# Patient Record
Sex: MALE | Race: WHITE | NOT HISPANIC OR LATINO | Employment: FULL TIME | URBAN - METROPOLITAN AREA
[De-identification: names, ages, dates, MRNs, and addresses within clinical notes are randomized per-mention and may not be internally consistent; named-entity substitution may affect disease eponyms.]

---

## 2020-05-25 LAB — CRC RECOMMENDATION EXT: NORMAL

## 2023-12-12 ENCOUNTER — OFFICE VISIT (OUTPATIENT)
Dept: FAMILY MEDICINE | Facility: CLINIC | Age: 58
End: 2023-12-12
Payer: COMMERCIAL

## 2023-12-12 VITALS
OXYGEN SATURATION: 95 % | TEMPERATURE: 99 F | SYSTOLIC BLOOD PRESSURE: 155 MMHG | HEIGHT: 72 IN | BODY MASS INDEX: 32.37 KG/M2 | DIASTOLIC BLOOD PRESSURE: 95 MMHG | HEART RATE: 87 BPM | WEIGHT: 239 LBS | RESPIRATION RATE: 18 BRPM

## 2023-12-12 DIAGNOSIS — Z13.6 ENCOUNTER FOR SCREENING FOR CARDIOVASCULAR DISORDERS: ICD-10-CM

## 2023-12-12 DIAGNOSIS — F17.200 SMOKER: ICD-10-CM

## 2023-12-12 DIAGNOSIS — D75.1 POLYCYTHEMIA: ICD-10-CM

## 2023-12-12 DIAGNOSIS — G47.33 OSA (OBSTRUCTIVE SLEEP APNEA): ICD-10-CM

## 2023-12-12 DIAGNOSIS — E66.09 CLASS 1 OBESITY DUE TO EXCESS CALORIES WITH SERIOUS COMORBIDITY AND BODY MASS INDEX (BMI) OF 32.0 TO 32.9 IN ADULT: ICD-10-CM

## 2023-12-12 DIAGNOSIS — E55.9 VITAMIN D DEFICIENCY: ICD-10-CM

## 2023-12-12 DIAGNOSIS — E78.2 MIXED HYPERLIPIDEMIA: ICD-10-CM

## 2023-12-12 DIAGNOSIS — R73.03 PREDIABETES: ICD-10-CM

## 2023-12-12 DIAGNOSIS — I10 PRIMARY HYPERTENSION: Primary | ICD-10-CM

## 2023-12-12 DIAGNOSIS — Z87.891 PERSONAL HISTORY OF TOBACCO USE, PRESENTING HAZARDS TO HEALTH: ICD-10-CM

## 2023-12-12 PROBLEM — E66.811 CLASS 1 OBESITY DUE TO EXCESS CALORIES WITH BODY MASS INDEX (BMI) OF 32.0 TO 32.9 IN ADULT: Status: ACTIVE | Noted: 2023-12-12

## 2023-12-12 PROCEDURE — 3008F BODY MASS INDEX DOCD: CPT | Mod: CPTII,,, | Performed by: FAMILY MEDICINE

## 2023-12-12 PROCEDURE — 3080F PR MOST RECENT DIASTOLIC BLOOD PRESSURE >= 90 MM HG: ICD-10-PCS | Mod: CPTII,,, | Performed by: FAMILY MEDICINE

## 2023-12-12 PROCEDURE — 3044F HG A1C LEVEL LT 7.0%: CPT | Mod: CPTII,,, | Performed by: FAMILY MEDICINE

## 2023-12-12 PROCEDURE — 1159F MED LIST DOCD IN RCRD: CPT | Mod: CPTII,,, | Performed by: FAMILY MEDICINE

## 2023-12-12 PROCEDURE — 3008F PR BODY MASS INDEX (BMI) DOCUMENTED: ICD-10-PCS | Mod: CPTII,,, | Performed by: FAMILY MEDICINE

## 2023-12-12 PROCEDURE — 99214 OFFICE O/P EST MOD 30 MIN: CPT | Mod: ,,, | Performed by: FAMILY MEDICINE

## 2023-12-12 PROCEDURE — 1160F RVW MEDS BY RX/DR IN RCRD: CPT | Mod: CPTII,,, | Performed by: FAMILY MEDICINE

## 2023-12-12 PROCEDURE — 4010F ACE/ARB THERAPY RXD/TAKEN: CPT | Mod: CPTII,,, | Performed by: FAMILY MEDICINE

## 2023-12-12 PROCEDURE — 3080F DIAST BP >= 90 MM HG: CPT | Mod: CPTII,,, | Performed by: FAMILY MEDICINE

## 2023-12-12 PROCEDURE — 1159F PR MEDICATION LIST DOCUMENTED IN MEDICAL RECORD: ICD-10-PCS | Mod: CPTII,,, | Performed by: FAMILY MEDICINE

## 2023-12-12 PROCEDURE — 3044F PR MOST RECENT HEMOGLOBIN A1C LEVEL <7.0%: ICD-10-PCS | Mod: CPTII,,, | Performed by: FAMILY MEDICINE

## 2023-12-12 PROCEDURE — 3077F PR MOST RECENT SYSTOLIC BLOOD PRESSURE >= 140 MM HG: ICD-10-PCS | Mod: CPTII,,, | Performed by: FAMILY MEDICINE

## 2023-12-12 PROCEDURE — 3077F SYST BP >= 140 MM HG: CPT | Mod: CPTII,,, | Performed by: FAMILY MEDICINE

## 2023-12-12 PROCEDURE — 1160F PR REVIEW ALL MEDS BY PRESCRIBER/CLIN PHARMACIST DOCUMENTED: ICD-10-PCS | Mod: CPTII,,, | Performed by: FAMILY MEDICINE

## 2023-12-12 PROCEDURE — 99214 PR OFFICE/OUTPT VISIT, EST, LEVL IV, 30-39 MIN: ICD-10-PCS | Mod: ,,, | Performed by: FAMILY MEDICINE

## 2023-12-12 PROCEDURE — 4010F PR ACE/ARB THEARPY RXD/TAKEN: ICD-10-PCS | Mod: CPTII,,, | Performed by: FAMILY MEDICINE

## 2023-12-12 RX ORDER — ERGOCALCIFEROL 1.25 MG/1
50000 CAPSULE ORAL
Qty: 12 CAPSULE | Refills: 3 | Status: SHIPPED | OUTPATIENT
Start: 2023-12-12 | End: 2024-03-05 | Stop reason: SDUPTHER

## 2023-12-12 RX ORDER — ROSUVASTATIN CALCIUM 5 MG/1
5 TABLET, COATED ORAL NIGHTLY
Qty: 90 TABLET | Refills: 3 | Status: SHIPPED | OUTPATIENT
Start: 2023-12-12

## 2023-12-12 RX ORDER — BENAZEPRIL HYDROCHLORIDE AND HYDROCHLOROTHIAZIDE 20; 12.5 MG/1; MG/1
1 TABLET ORAL EVERY MORNING
Qty: 90 TABLET | Refills: 3 | Status: SHIPPED | OUTPATIENT
Start: 2023-12-12

## 2023-12-12 NOTE — ASSESSMENT & PLAN NOTE
Lab Results   Component Value Date    TRIG 153 (H) 12/06/2023    HDL 47 12/06/2023    LDLCALC 101 (H) 12/06/2023   Patient is not at goal.  Will start Crestor 5 mg nightly.

## 2023-12-12 NOTE — PROGRESS NOTES
Patient Name: Jonny Michaud     Patient ID: [id number]     Chief Complaint: Results (Go over lab results.)      HPI:     Jonny Michaud is a 58 y.o. male here today for follow up for Prediabetes, Vitamin D deficiency, and lab work results. Reviewed and discussed lab work results.    Past Medical History:   Diagnosis Date    Fatty liver     AUGIE (obstructive sleep apnea)     Prediabetes     Smoker     Vitamin D deficiency         History reviewed. No pertinent surgical history.     Social History     Socioeconomic History    Marital status:     Number of children: 2   Tobacco Use    Smoking status: Every Day     Current packs/day: 1.00     Average packs/day: 1 pack/day for 37.9 years (37.9 ttl pk-yrs)     Types: Cigarettes     Start date: 1986    Smokeless tobacco: Never   Substance and Sexual Activity    Alcohol use: Yes     Alcohol/week: 21.0 standard drinks of alcohol     Types: 21 Cans of beer per week    Drug use: Never    Sexual activity: Not Currently     Social Determinants of Health     Financial Resource Strain: Low Risk  (12/12/2023)    Overall Financial Resource Strain (CARDIA)     Difficulty of Paying Living Expenses: Not hard at all   Food Insecurity: No Food Insecurity (12/12/2023)    Hunger Vital Sign     Worried About Running Out of Food in the Last Year: Never true     Ran Out of Food in the Last Year: Never true   Transportation Needs: No Transportation Needs (12/12/2023)    PRAPARE - Transportation     Lack of Transportation (Medical): No     Lack of Transportation (Non-Medical): No   Physical Activity: Sufficiently Active (12/12/2023)    Exercise Vital Sign     Days of Exercise per Week: 5 days     Minutes of Exercise per Session: 30 min   Stress: No Stress Concern Present (12/12/2023)    [employer] - Occupational Stress Questionnaire     Feeling of Stress : Not at all   Social Connections: Moderately Integrated (12/12/2023)    Social Connection and Isolation  Panel [NHANES]     Frequency of Communication with Friends and Family: Three times a week     Frequency of Social Gatherings with Friends and Family: Three times a week     Attends Gnosticist Services: More than 4 times per year     Active Member of Clubs or Organizations: No     Attends Club or Organization Meetings: Never     Marital Status:    Housing Stability: Unknown (12/12/2023)    Housing Stability Vital Sign     Unable to Pay for Housing in the Last Year: No     Unstable Housing in the Last Year: No        Current Outpatient Medications   Medication Instructions    benazepril-hydrochlorthiazide (LOTENSIN HCT) 20-12.5 mg per tablet 1 tablet, Oral, Every morning    ergocalciferol (ERGOCALCIFEROL) 50,000 Units, Oral, Every 7 days    rosuvastatin (CRESTOR) 5 mg, Oral, Nightly       Review of patient's allergies indicates:  No Known Allergies       There is no immunization history on file for this patient.    Patient Care Team:  Cayetano Ferguson Sr., MD as PCP - General (Family Medicine)  El Law MD as Consulting Physician (Gastroenterology)     Subjective:     Review of Systems    10 point review of systems conducted, negative except as stated in the history of present illness. See HPI for details.    Objective:     Visit Vitals  BP (!) 155/95 (BP Location: Right arm, Patient Position: Sitting, BP Method: Medium (Manual))   Pulse 87   Temp 98.7 °F (37.1 °C)   Resp 18   Ht 6' (1.829 m)   Wt 108.4 kg (239 lb)   SpO2 95%   BMI 32.41 kg/m²       Physical Exam  Constitutional:       Appearance: He is obese.   HENT:      Head: Normocephalic and atraumatic.   Cardiovascular:      Rate and Rhythm: Normal rate and regular rhythm.   Pulmonary:      Effort: Pulmonary effort is normal.      Breath sounds: Normal breath sounds.   Abdominal:      Palpations: Abdomen is soft.      Tenderness: There is no abdominal tenderness.   Musculoskeletal:         General: No swelling or tenderness. Normal range of  motion.      Cervical back: Normal range of motion and neck supple.      Right lower leg: No edema.      Left lower leg: No edema.   Lymphadenopathy:      Cervical: No cervical adenopathy.   Skin:     General: Skin is warm and dry.   Neurological:      General: No focal deficit present.      Mental Status: He is alert and oriented to person, place, and time.   Psychiatric:         Mood and Affect: Mood normal.         Assessment:       ICD-10-CM ICD-9-CM   1. Primary hypertension  I10 401.9   2. Mixed hyperlipidemia  E78.2 272.2   3. Prediabetes  R73.03 790.29   4. Polycythemia  D75.1 238.4   5. Class 1 obesity due to excess calories with serious comorbidity and body mass index (BMI) of 32.0 to 32.9 in adult  E66.09 278.00    Z68.32 V85.32   6. AUGIE (obstructive sleep apnea)  G47.33 327.23   7. Vitamin D deficiency  E55.9 268.9   8. Smoker  F17.200 305.1   9. Personal history of tobacco use, presenting hazards to health  Z87.891 V15.82   10. Encounter for screening for cardiovascular disorders  Z13.6 V81.2       Plan:   1. Primary hypertension  Overview:  Low Sodium Diet (DASH Diet - Less than 2 grams of sodium per day).  Monitor blood pressure daily and log. Report consistent numbers greater than 140/90.  Maintain healthy weight with goal BMI <30. Exercise 30 minutes per day, 5 days per week.    Assessment & Plan:  BP Readings from Last 1 Encounters:   12/12/23 (!) 155/95   Patient is not at goal.   Will start Benazepril HCT 20-12.5 mg every morning.  Monitor BP daily at home, BP should be less than or equal to 130/80.    Orders:  -     benazepril-hydrochlorthiazide (LOTENSIN HCT) 20-12.5 mg per tablet; Take 1 tablet by mouth every morning.  Dispense: 90 tablet; Refill: 3    2. Mixed hyperlipidemia  Overview:  Stressed importance of dietary modifications. Follow a low cholesterol, low saturated fat diet with less that 200mg of cholesterol a day.  Avoid fried foods and high saturated fats (high saturated fats less  than 7% of calories).  Add Flax Seed/Fish Oil supplements to diet. Increase dietary fiber.  Regular exercise can reduce LDL and raise HDL. Stressed importance of physical activity 5 times per week for 30 minutes per day.      Assessment & Plan:  Lab Results   Component Value Date    TRIG 153 (H) 12/06/2023    HDL 47 12/06/2023    LDLCALC 101 (H) 12/06/2023   Patient is not at goal.  Will start Crestor 5 mg nightly.    Orders:  -     rosuvastatin (CRESTOR) 5 MG tablet; Take 1 tablet (5 mg total) by mouth every evening.  Dispense: 90 tablet; Refill: 3  -     Lipid Panel; Future; Expected date: 02/12/2024  -     Hepatic Function Panel; Future; Expected date: 02/12/2024    3. Prediabetes  Overview:  Patient is diet controlled.  Follow ADA Diet. Avoid soda, simple sweets, and limit rice/pasta/breads/starches (no more than 45-50 grams per meal).  Maintain healthy weight with goal BMI <30.  Exercise 5 times per week for 30 minutes per day.  Stressed importance of daily foot exams especially if neuropathy is present.  Patient was instructed to notify physician if they notice any sores or skin lesions on the feet.  Stressed the importance of wearing proper fitting comfortable shoes i.e. diabetic footwear.  They were instructed to always wear shoes and never go barefooted.  Stressed importance of annual dilated eye exam.     Assessment & Plan:  Lab Results   Component Value Date    HGBA1C 6.0 (H) 12/06/2023   Patient is at goal.    Orders:  -     Hemoglobin A1C; Future; Expected date: 02/12/2024    4. Polycythemia  Overview:  Patient instructed to start on low-dose aspirin 82 mg daily if he is not allergic to such.  He was told to stay well hydrated at all times.  If patient is a smoker, cessation was discussed.  Donating blood at appropriate intervals as well as therapeutic phlebotomies was also discussed.    Assessment & Plan:  Most recent Hgb 18.1 g/dL and Hct 54.5 % on 12/06/2023.  Patient is not at goal.  Instructed  patient to contact [facility] to donate blood on a regular basis.    Orders:  -     CBC Auto Differential; Future; Expected date: 02/12/2024    5. Class 1 obesity due to excess calories with serious comorbidity and body mass index (BMI) of 32.0 to 32.9 in adult  Overview:  Body mass index is 32.41 kg/m².  Goal BMI <30.  Exercise 5 times a week for 30 minutes per day.  Avoid soda, simple sugars, excessive rice, potatoes or bread. Limit fast foods and fried foods.  Choose complex carbs in moderation (example: green vegetables, beans, oatmeal). Eat plenty of fresh fruits and vegetables with lean meats daily.  Do not skip meals. Eat a balanced portion size.  Avoid fad diets. Consider permanent healthy life style changes.        6. AUGIE (obstructive sleep apnea)  Overview:  Wears CPAP/BiPAP and reports compliance nightly. Life time use expected.  Has experienced improved daytime fatigue.  Avoid excessive alcohol, smoking and overuse of sedatives at bedtime.  Weight management discussed. Goal BMI <30.  Adjust sleep position PRN.      7. Vitamin D deficiency  Overview:  Continue with vitamin-D supplement.    Assessment & Plan:  Most recent Vitamin D 30.5 ng/mL on 12/06/2023.  D/C OTC Vitamin D3 5,000 units.  Will start Vitamin D 50,000 units Q week.    Orders:  -     ergocalciferol (ERGOCALCIFEROL) 50,000 unit Cap; Take 1 capsule (50,000 Units total) by mouth every 7 days.  Dispense: 12 capsule; Refill: 3    8. Smoker  Overview:  Patient was offered smoking cessation techniques such as nicotine gum or patches.  They were also offered a more regimented smoking cessation program.  They were advised to decrease the number of cigarettes by 1 every few days until they completely stopped.  It was strongly recommended that they set a quit date and stick to it.  And finally, important health reasons to stop smoking were given to the patient. Approximately 10 minutes was spent discussing smoking cessation.    Orders:  -     Ambulatory  referral/consult to Smoking Cessation Program; Future; Expected date: 01/12/2024    9. Personal history of tobacco use, presenting hazards to health  -     CT Chest Lung Screening Low Dose; Future; Expected date: 01/12/2024    10. Encounter for screening for cardiovascular disorders  -     CT Calcium Scoring Cardiac; Future; Expected date: 01/12/2024        [x] Discussed lab findings with the patient.  [x] Discussed diet, exercise and if appropriate, weight loss.  [x] Instructions and information, including risks and benefits of prescribed medication(s) have been reviewed with the patient and patient verbalizes understanding. Questions have been answered to the patient's satisfaction.  [] Appropriate counseling has been given regarding anxiety and depressive issues that were discussed today.  [] Any lab drawn today will be reviewed by physician at the time it is received and appropriate recommendations bill be made and discussed with patient.     Follow up in about 2 months (around 2/12/2024) for Follow Up, With Fasting Labs prior to visit.   In addition to their scheduled follow up, the patient has also been instructed to follow up on as needed basis.     Future Appointments   Date Time Provider Department Center   2/20/2024  7:40 AM LAB, [facility]   2/26/2024  2:00 PM Cayetano Ferguson Sr., MD [facility] [place] [facility] Jb Bourgeois Sr, MD

## 2023-12-12 NOTE — ASSESSMENT & PLAN NOTE
Most recent Vitamin D 30.5 ng/mL on 12/06/2023.  D/C OTC Vitamin D3 5,000 units.  Will start Vitamin D 50,000 units Q week.

## 2023-12-12 NOTE — ASSESSMENT & PLAN NOTE
Most recent Hgb 18.1 g/dL and Hct 54.5 % on 12/06/2023.  Patient is not at goal.  Instructed patient to contact [facility] to donate blood on a regular basis.

## 2023-12-12 NOTE — ASSESSMENT & PLAN NOTE
BP Readings from Last 1 Encounters:   12/12/23 (!) 155/95   Patient is not at goal.   Will start Benazepril HCT 20-12.5 mg every morning.  Monitor BP daily at home, BP should be less than or equal to 130/80.

## 2024-01-17 DIAGNOSIS — K76.0 FATTY LIVER: Primary | ICD-10-CM

## 2024-01-17 DIAGNOSIS — Z87.891 PERSONAL HISTORY OF TOBACCO USE, PRESENTING HAZARDS TO HEALTH: ICD-10-CM

## 2024-01-18 DIAGNOSIS — R91.1 SOLITARY PULMONARY NODULE: Primary | ICD-10-CM

## 2024-02-02 ENCOUNTER — DOCUMENTATION ONLY (OUTPATIENT)
Dept: FAMILY MEDICINE | Facility: CLINIC | Age: 59
End: 2024-02-02
Payer: COMMERCIAL

## 2024-02-28 ENCOUNTER — OFFICE VISIT (OUTPATIENT)
Dept: FAMILY MEDICINE | Facility: CLINIC | Age: 59
End: 2024-02-28
Payer: COMMERCIAL

## 2024-02-28 VITALS
TEMPERATURE: 97 F | BODY MASS INDEX: 33.4 KG/M2 | WEIGHT: 246.63 LBS | HEIGHT: 72 IN | HEART RATE: 74 BPM | OXYGEN SATURATION: 94 % | SYSTOLIC BLOOD PRESSURE: 144 MMHG | RESPIRATION RATE: 20 BRPM | DIASTOLIC BLOOD PRESSURE: 88 MMHG

## 2024-02-28 DIAGNOSIS — I10 PRIMARY HYPERTENSION: Primary | ICD-10-CM

## 2024-02-28 DIAGNOSIS — R73.03 PREDIABETES: ICD-10-CM

## 2024-02-28 DIAGNOSIS — E55.9 VITAMIN D DEFICIENCY: ICD-10-CM

## 2024-02-28 DIAGNOSIS — R91.1 SOLITARY PULMONARY NODULE: ICD-10-CM

## 2024-02-28 DIAGNOSIS — F17.200 SMOKER: ICD-10-CM

## 2024-02-28 DIAGNOSIS — E78.2 MIXED HYPERLIPIDEMIA: ICD-10-CM

## 2024-02-28 DIAGNOSIS — D75.1 POLYCYTHEMIA: ICD-10-CM

## 2024-02-28 PROBLEM — Z87.891 PERSONAL HISTORY OF TOBACCO USE, PRESENTING HAZARDS TO HEALTH: Status: RESOLVED | Noted: 2023-12-12 | Resolved: 2024-02-28

## 2024-02-28 PROCEDURE — 3077F SYST BP >= 140 MM HG: CPT | Mod: CPTII,,, | Performed by: FAMILY MEDICINE

## 2024-02-28 PROCEDURE — 1159F MED LIST DOCD IN RCRD: CPT | Mod: CPTII,,, | Performed by: FAMILY MEDICINE

## 2024-02-28 PROCEDURE — 3008F BODY MASS INDEX DOCD: CPT | Mod: CPTII,,, | Performed by: FAMILY MEDICINE

## 2024-02-28 PROCEDURE — 3079F DIAST BP 80-89 MM HG: CPT | Mod: CPTII,,, | Performed by: FAMILY MEDICINE

## 2024-02-28 PROCEDURE — 99214 OFFICE O/P EST MOD 30 MIN: CPT | Mod: ,,, | Performed by: FAMILY MEDICINE

## 2024-02-28 PROCEDURE — 3044F HG A1C LEVEL LT 7.0%: CPT | Mod: CPTII,,, | Performed by: FAMILY MEDICINE

## 2024-02-28 RX ORDER — VITAMIN E 268 MG
400 CAPSULE ORAL DAILY
COMMUNITY

## 2024-02-28 NOTE — PROGRESS NOTES
Patient Name: Jonny Michaud     Patient ID: [id number]     Chief Complaint: Results (Patient here to discuss lab results.)      HPI:     Jonny Michaud is a 58 y.o. male here today for follow-up on hypertension, prediabetes, hyperlipidemia and vitamin-D deficiency.    Past Medical History:   Diagnosis Date    Fatty liver     AUGIE (obstructive sleep apnea)     Personal history of tobacco use, presenting hazards to health 2023    Prediabetes     Smoker     Vitamin D deficiency         History reviewed. No pertinent surgical history.     Social History     Socioeconomic History    Marital status:     Number of children: 2   Tobacco Use    Smoking status: Former     Current packs/day: 0.00     Average packs/day: 1 pack/day for 38.0 years (38.0 ttl pk-yrs)     Types: Cigarettes     Start date:      Quit date: 2023     Years since quittin.2    Smokeless tobacco: Never   Substance and Sexual Activity    Alcohol use: Yes     Alcohol/week: 21.0 standard drinks of alcohol     Types: 21 Cans of beer per week    Drug use: Never    Sexual activity: Not Currently     Social Determinants of Health     Financial Resource Strain: Low Risk  (2023)    Overall Financial Resource Strain (CARDIA)     Difficulty of Paying Living Expenses: Not hard at all   Food Insecurity: No Food Insecurity (2023)    Hunger Vital Sign     Worried About Running Out of Food in the Last Year: Never true     Ran Out of Food in the Last Year: Never true   Transportation Needs: No Transportation Needs (2023)    PRAPARE - Transportation     Lack of Transportation (Medical): No     Lack of Transportation (Non-Medical): No   Physical Activity: Sufficiently Active (2023)    Exercise Vital Sign     Days of Exercise per Week: 5 days     Minutes of Exercise per Session: 30 min   Stress: No Stress Concern Present (2023)    [employer] [place] [employer] - Occupational Stress Questionnaire     Feeling of  Stress : Not at all   Social Connections: Moderately Integrated (12/12/2023)    Social Connection and Isolation Panel [NHANES]     Frequency of Communication with Friends and Family: Three times a week     Frequency of Social Gatherings with Friends and Family: Three times a week     Attends Buddhism Services: More than 4 times per year     Active Member of Clubs or Organizations: No     Attends Club or Organization Meetings: Never     Marital Status:    Housing Stability: Unknown (12/12/2023)    Housing Stability Vital Sign     Unable to Pay for Housing in the Last Year: No     Unstable Housing in the Last Year: No        Current Outpatient Medications   Medication Instructions    benazepril-hydrochlorthiazide (LOTENSIN HCT) 20-12.5 mg per tablet 1 tablet, Oral, Every morning    ergocalciferol (ERGOCALCIFEROL) 50,000 Units, Oral, Every 7 days    rosuvastatin (CRESTOR) 5 mg, Oral, Nightly    vitamin E 400 Units, Oral, Daily       Review of patient's allergies indicates:  No Known Allergies       There is no immunization history on file for this patient.    Patient Care Team:  Cayetano Ferguson Sr., MD as PCP - General (Family Medicine)  El Law MD as Consulting Physician (Gastroenterology)     Subjective:     Review of Systems    10 point review of systems conducted, negative except as stated in the history of present illness. See HPI for details.    Objective:     Visit Vitals  BP (!) 144/88 (BP Location: Right arm, Patient Position: Sitting, BP Method: Medium (Manual))   Pulse 74   Temp 97.2 °F (36.2 °C)   Resp 20   Ht 6' (1.829 m)   Wt 111.9 kg (246 lb 9.6 oz)   SpO2 (!) 94%   BMI 33.44 kg/m²       Physical Exam    Assessment:       ICD-10-CM ICD-9-CM   1. Primary hypertension  I10 401.9   2. Mixed hyperlipidemia  E78.2 272.2   3. Prediabetes  R73.03 790.29   4. Smoker -former  F17.200 305.1   5. Polycythemia  D75.1 238.4   6. Vitamin D deficiency  E55.9 268.9   7. Solitary pulmonary nodule   R91.1 793.11       Plan:   1. Primary hypertension  Overview:  Current medication: Benazepril - Hydrochlorthiazide 20-12.5 mg one po q day.  Low Sodium Diet (DASH Diet - Less than 2 grams of sodium per day).  Monitor blood pressure daily and log. Report consistent numbers greater than 140/90.  Maintain healthy weight with goal BMI <30. Exercise 30 minutes per day, 5 days per week.    Assessment & Plan:  Pt reports B/P readings at home 128/86. Instructed pt to being home cuff at next visit.      2. Mixed hyperlipidemia  Overview:  Current medication: Crestor 5 mg one po qhs.  Stressed importance of dietary modifications. Follow a low cholesterol, low saturated fat diet with less that 200mg of cholesterol a day.  Avoid fried foods and high saturated fats (high saturated fats less than 7% of calories).  Add Flax Seed/Fish Oil supplements to diet. Increase dietary fiber.  Regular exercise can reduce LDL and raise HDL. Stressed importance of physical activity 5 times per week for 30 minutes per day.        3. Prediabetes  Overview:  Patient is diet controlled.  Follow ADA Diet. Avoid soda, simple sweets, and limit rice/pasta/breads/starches (no more than 45-50 grams per meal).  Maintain healthy weight with goal BMI <30.  Exercise 5 times per week for 30 minutes per day.  Stressed importance of daily foot exams especially if neuropathy is present.  Patient was instructed to notify physician if they notice any sores or skin lesions on the feet.  Stressed the importance of wearing proper fitting comfortable shoes i.e. diabetic footwear.  They were instructed to always wear shoes and never go barefooted.  Stressed importance of annual dilated eye exam.     Assessment & Plan:  02/20/24 A1C= 5.9%  12/06/2023 A1C- 6.0%  Pt. has made dietary modifications. He stopped drinking sweet tea and Mountain Dew        4. Smoker -former  Overview:  Patient was offered smoking cessation techniques such as nicotine gum or patches.  They  were also offered a more regimented smoking cessation program.  They were advised to decrease the number of cigarettes by 1 every few days until they completely stopped.  It was strongly recommended that they set a quit date and stick to it.  And finally, important health reasons to stop smoking were given to the patient. Approximately 10 minutes was spent discussing smoking cessation.    Assessment & Plan:  Pt. Quit smoking December16,2023.       5. Polycythemia  Overview:   He was told to stay well hydrated at all times.  He has stopped smoking . Continued monitoring of his H&H    Assessment & Plan:  02/20/2024  H/H 15.85&47.9      6. Vitamin D deficiency  Overview:  Continue with vitamin-D supplement 42488 units weekly.    Assessment & Plan:  Patient's last vitamin-D level was 30.5 on 12/06/2023.  Patient has been on 68210 units weekly since that time and we are repeating his vitamin-D level today.      7. Solitary pulmonary nodule  Comments:  We will continue to monitor this finding with a repeat CT.        [x] Discussed lab findings with the patient.  [x] Discussed diet, exercise and if appropriate, weight loss.  [] Instructions and information, including risks and benefits of prescribed medication(s) have been reviewed with the patient and patient verbalizes understanding. Questions have been answered to the patient's satisfaction.  [] Appropriate counseling has been given regarding anxiety and depressive issues that were discussed today.  [] Any lab drawn today will be reviewed by physician at the time it is received and appropriate recommendations bill be made and discussed with patient.     No follow-ups on file.   In addition to their scheduled follow up, the patient has also been instructed to follow up on as needed basis.     No future appointments.     Cayetano Ferguson Sr, MD

## 2024-02-28 NOTE — ASSESSMENT & PLAN NOTE
02/20/24 A1C= 5.9%  12/06/2023 A1C- 6.0%  Pt. has made dietary modifications. He stopped drinking sweet tea and Mountain Dew     Right L4/L5 TFESI.   Quality 226: Preventive Care And Screening: Tobacco Use: Screening And Cessation Intervention: Patient screened for tobacco use and is an ex/non-smoker Detail Level: Detailed Quality 130: Documentation Of Current Medications In The Medical Record: Current Medications Documented Quality 431: Preventive Care And Screening: Unhealthy Alcohol Use - Screening: Patient not identified as an unhealthy alcohol user when screened for unhealthy alcohol use using a systematic screening method

## 2024-02-28 NOTE — ASSESSMENT & PLAN NOTE
Patient's last vitamin-D level was 30.5 on 12/06/2023.  Patient has been on 89291 units weekly since that time and we are repeating his vitamin-D level today.

## 2024-02-29 LAB — 25(OH)D3+25(OH)D2 SERPL-MCNC: 36.2 NG/ML (ref 30–100)

## 2024-03-05 ENCOUNTER — TELEPHONE (OUTPATIENT)
Dept: FAMILY MEDICINE | Facility: CLINIC | Age: 59
End: 2024-03-05
Payer: COMMERCIAL

## 2024-03-05 DIAGNOSIS — E55.9 VITAMIN D DEFICIENCY: ICD-10-CM

## 2024-03-05 RX ORDER — ERGOCALCIFEROL 1.25 MG/1
50000 CAPSULE ORAL
Qty: 12 CAPSULE | Refills: 5 | Status: SHIPPED | OUTPATIENT
Start: 2024-03-05

## 2024-03-05 NOTE — TELEPHONE ENCOUNTER
Spoke with Dr Ferguson regarding patients Vit D level results and they came back 36.2 (2/28/2024) @LDA(10)@ [facility] ordered Rx Vit D 3 (Ergocalciferol) 50,000IU 1 capsule Q 5 days  # 12  X 5 refills. And will recheck his vit D with his lab in August appointment 8/29/24 lab and results 9/4/24.

## 2024-03-06 ENCOUNTER — CLINICAL SUPPORT (OUTPATIENT)
Dept: FAMILY MEDICINE | Facility: CLINIC | Age: 59
End: 2024-03-06
Payer: COMMERCIAL

## 2024-03-06 VITALS
WEIGHT: 244 LBS | HEART RATE: 83 BPM | HEIGHT: 72 IN | BODY MASS INDEX: 33.05 KG/M2 | TEMPERATURE: 100 F | OXYGEN SATURATION: 97 % | SYSTOLIC BLOOD PRESSURE: 122 MMHG | RESPIRATION RATE: 18 BRPM | DIASTOLIC BLOOD PRESSURE: 80 MMHG

## 2024-03-06 DIAGNOSIS — I10 PRIMARY HYPERTENSION: Primary | ICD-10-CM

## 2024-03-06 PROCEDURE — 99211 OFF/OP EST MAY X REQ PHY/QHP: CPT | Mod: ,,, | Performed by: FAMILY MEDICINE

## 2024-03-06 NOTE — PROGRESS NOTES
Patient came in today for a blood pressure check and a at home cuff calibration. The patients blood pressure was 120/75 and his at home cuff read 122/76 so his readings were good and his at home cuff if calibrated. Patient will monitor his blood pressure once a day for two weeks and then once a week for a month. Patient was instructed to give us a call if his systolic number goes above 140.

## 2024-03-18 PROBLEM — Z13.6 ENCOUNTER FOR SCREENING FOR CARDIOVASCULAR DISORDERS: Status: RESOLVED | Noted: 2023-12-12 | Resolved: 2024-03-18

## 2024-07-19 ENCOUNTER — TELEPHONE (OUTPATIENT)
Dept: FAMILY MEDICINE | Facility: CLINIC | Age: 59
End: 2024-07-19
Payer: COMMERCIAL

## 2024-07-19 DIAGNOSIS — R91.8 OTHER NONSPECIFIC ABNORMAL FINDING OF LUNG FIELD: Primary | ICD-10-CM

## 2024-07-19 NOTE — TELEPHONE ENCOUNTER
----- Message from Andrew Smith MD sent at 7/18/2024 10:20 AM CDT -----  Heel and right upper lobe.  Repeat chest CT in 1 year.

## 2024-09-23 ENCOUNTER — TELEPHONE (OUTPATIENT)
Dept: PHARMACY | Facility: CLINIC | Age: 59
End: 2024-09-23
Payer: COMMERCIAL

## 2024-09-24 NOTE — TELEPHONE ENCOUNTER
[facility]/Population Health Chart Review & Patient Outreach Details For Medication Adherence Project    Reason for Outreach Encounter: 3rd Party payor non-compliance report ([employer], [employer], [facility], etc)  2.  Patient Outreach Method: Simplificarehart message  3.   Medication in question:    LAST FILLED: 3/19/24 for 90 day supply for both  Hypertension Medications               benazepril-hydrochlorthiazide (LOTENSIN HCT) 20-12.5 mg per tablet Take 1 tablet by mouth every morning.              Hyperlipidemia Medications               rosuvastatin (CRESTOR) 5 MG tablet Take 1 tablet (5 mg total) by mouth every evening.               4.  Reviewed and or Updates Made To: Patient Chart  5. Outreach Outcomes and/or actions taken: Sent inquiry to patient: Waiting for response.

## 2024-09-26 ENCOUNTER — OFFICE VISIT (OUTPATIENT)
Dept: FAMILY MEDICINE | Facility: CLINIC | Age: 59
End: 2024-09-26
Payer: COMMERCIAL

## 2024-09-26 VITALS
RESPIRATION RATE: 20 BRPM | OXYGEN SATURATION: 100 % | BODY MASS INDEX: 35.73 KG/M2 | SYSTOLIC BLOOD PRESSURE: 146 MMHG | WEIGHT: 263.81 LBS | TEMPERATURE: 98 F | DIASTOLIC BLOOD PRESSURE: 98 MMHG | HEIGHT: 72 IN | HEART RATE: 90 BPM

## 2024-09-26 DIAGNOSIS — Z12.5 SCREENING FOR MALIGNANT NEOPLASM OF PROSTATE: ICD-10-CM

## 2024-09-26 DIAGNOSIS — M25.561 ACUTE PAIN OF BOTH KNEES: ICD-10-CM

## 2024-09-26 DIAGNOSIS — I10 PRIMARY HYPERTENSION: Primary | ICD-10-CM

## 2024-09-26 DIAGNOSIS — Z11.59 ENCOUNTER FOR HEPATITIS C SCREENING TEST FOR LOW RISK PATIENT: ICD-10-CM

## 2024-09-26 DIAGNOSIS — M25.562 ACUTE PAIN OF BOTH KNEES: ICD-10-CM

## 2024-09-26 DIAGNOSIS — Z11.4 ENCOUNTER FOR SCREENING FOR HIV: ICD-10-CM

## 2024-09-26 DIAGNOSIS — R73.03 PREDIABETES: ICD-10-CM

## 2024-09-26 DIAGNOSIS — E78.2 MIXED HYPERLIPIDEMIA: ICD-10-CM

## 2024-09-26 DIAGNOSIS — Z00.00 WELLNESS EXAMINATION: ICD-10-CM

## 2024-09-26 RX ORDER — DICLOFENAC SODIUM 10 MG/G
2 GEL TOPICAL 4 TIMES DAILY
Qty: 100 G | Refills: 2 | Status: SHIPPED | OUTPATIENT
Start: 2024-09-26

## 2024-09-26 NOTE — PROGRESS NOTES
"  Family Medicine    Patient ID: [id number]     Chief Complaint: Annual Exam, Results (Patient here for lab results.), and Skin Tags (Patient request referral to remove skin tags from under both arms.)      HPI:     Jonny Michaud is a 58 y.o. male here today for an annual wellness. Reviewed and discussed lab results.    Patient has about seven or 8 total skin tags to bilateral flanks that he would like removed.  They do not bother him, but he does not want them to get out-of-control" like his dad's did.    His blood pressure at home is usually in the 120s/80. In clinic 146/98. He is compliant with Lotensin HCTZ     Health Maintenance         Date Due Completion Date    Hepatitis C Screening Never done ---    Pneumococcal Vaccines (Age 0-64) (1 of 2 - PCV) Never done ---    HIV Screening Never done ---    TETANUS VACCINE Never done ---    COVID-19 Vaccine (2023-24 season) 2024    LDCT Lung Screen 2025    Hemoglobin A1c (Prediabetes) 2025    Lipid Panel 2029    Colorectal Cancer Screening 2030            Past Medical History:   Diagnosis Date    Fatty liver     AUGIE (obstructive sleep apnea)     Personal history of tobacco use, presenting hazards to health 2023    Prediabetes     Smoker     Vitamin D deficiency         History reviewed. No pertinent surgical history.     Social History     Tobacco Use    Smoking status: Former     Current packs/day: 0.00     Average packs/day: 1 pack/day for 38.0 years (38.0 ttl pk-yrs)     Types: Cigarettes     Start date:      Quit date: 2023     Years since quittin.7    Smokeless tobacco: Never   Substance and Sexual Activity    Alcohol use: Yes     Alcohol/week: 21.0 standard drinks of alcohol     Types: 21 Cans of beer per week    Drug use: Never    Sexual activity: Not Currently        Current Outpatient Medications   Medication Instructions    benazepril-hydrochlorthiazide " (LOTENSIN HCT) 20-12.5 mg per tablet 1 tablet, Oral, Every morning    diclofenac sodium (VOLTAREN ARTHRITIS PAIN) 2 g, Topical (Top), 4 times daily    ergocalciferol (ERGOCALCIFEROL) 50,000 Units, Oral, Every 5 days    NON FORMULARY MEDICATION Daily, Olive Oil Pill<BR><BR><BR><BR>Dr Law    rosuvastatin (CRESTOR) 5 mg, Oral, Nightly    vitamin E 400 Units, Oral, Daily       Review of patient's allergies indicates:  No Known Allergies     Patient Care Team:  Cayetano Ferguson Sr., MD as PCP - General (Family Medicine)  El Law MD as Consulting Physician (Gastroenterology)     Subjective:     Review of Systems   Constitutional:  Negative for activity change and unexpected weight change.   HENT:  Negative for hearing loss, rhinorrhea and trouble swallowing.    Eyes:  Negative for discharge and visual disturbance.   Respiratory:  Negative for chest tightness and wheezing.    Cardiovascular:  Negative for chest pain and palpitations.   Gastrointestinal:  Negative for blood in stool, constipation, diarrhea and vomiting.   Endocrine: Negative for polydipsia and polyuria.   Genitourinary:  Negative for difficulty urinating, hematuria and urgency.   Musculoskeletal:  Positive for arthralgias and joint swelling. Negative for neck pain.   Neurological:  Negative for weakness and headaches.   Psychiatric/Behavioral:  Negative for confusion and dysphoric mood.        12 point review of systems conducted, negative except as stated in the history of present illness. See HPI for details.    Objective:     Visit Vitals  BP (!) 146/98 (BP Location: Right arm, Patient Position: Sitting, BP Method: Medium (Manual))   Pulse 90   Temp 97.8 °F (36.6 °C)   Resp 20   Ht 6' (1.829 m)   Wt 119.7 kg (263 lb 12.8 oz)   SpO2 100%   BMI 35.78 kg/m²       Physical Exam  Vitals and nursing note reviewed.   Constitutional:       General: He is not in acute distress.     Appearance: He is not ill-appearing.   HENT:      Head:  "Normocephalic and atraumatic.      Mouth/Throat:      Mouth: Mucous membranes are moist.      Pharynx: Oropharynx is clear.   Eyes:      General: No scleral icterus.     Extraocular Movements: Extraocular movements intact.      Conjunctiva/sclera: Conjunctivae normal.      Pupils: Pupils are equal, round, and reactive to light.   Neck:      Vascular: No carotid bruit.   Cardiovascular:      Rate and Rhythm: Normal rate and regular rhythm.      Heart sounds: No murmur heard.     No friction rub. No gallop.   Pulmonary:      Effort: Pulmonary effort is normal. No respiratory distress.      Breath sounds: Normal breath sounds. No wheezing, rhonchi or rales.   Musculoskeletal:         General: Normal range of motion.      Cervical back: Normal range of motion and neck supple.   Skin:     General: Skin is warm and dry.      Comments: Small multiple skin tags to bilateral flanks   Neurological:      General: No focal deficit present.      Mental Status: He is alert.   Psychiatric:         Mood and Affect: Mood normal.         Labs Reviewed:     Chemistry:  Lab Results   Component Value Date     08/29/2024    K 5.0 08/29/2024    BUN 11 08/29/2024    CREATININE 0.78 08/29/2024    EGFRNORACEVR 104 08/29/2024    CALCIUM 9.0 08/29/2024    ALBUMIN 4.1 08/29/2024    BILIDIR <0.20 02/20/2024    AST 32 08/29/2024    ALT 39 08/29/2024    PQWUAVGW25FY 29.9 (L) 08/29/2024    TSH 2.060 08/29/2024        Lab Results   Component Value Date    HGBA1C 5.9 (H) 08/29/2024        Hematology:  Lab Results   Component Value Date    WBC 6.5 08/29/2024    HGB 15.6 08/29/2024    HCT 48.9 08/29/2024     08/29/2024       Lipid Panel:  Lab Results   Component Value Date    CHOL 154 08/29/2024    HDL 51 08/29/2024    TRIG 122 08/29/2024        Urine:  No results found for: "COLORUA", "APPEARANCEUA", "SGUA", "PHUA", "PROTEINUA", "GLUCOSEUA", "KETONESUA", "BLOODUA", "NITRITESUA", "LEUKOCYTESUR", "RBCUA", "WBCUA", "BACTERIA", "SQEPUA", " ""HYALINECASTS", "CREATRANDUR", "PROTEINURINE", "UPROTCREA"     Assessment:       ICD-10-CM ICD-9-CM   1. Primary hypertension  I10 401.9   2. Encounter for hepatitis C screening test for low risk patient  Z11.59 V73.89   3. Encounter for screening for HIV  Z11.4 V73.89   4. Mixed hyperlipidemia  E78.2 272.2   5. Prediabetes  R73.03 790.29   6. Wellness examination  Z00.00 V70.0   7. Screening for malignant neoplasm of prostate  Z12.5 V76.44   8. Acute pain of both knees  M25.561 338.19    M25.562 719.46        Plan:     AAA Screening - due at age 66    Prostate Cancer Screening - last PSA was 3.3 in December 20, 2023    Colon Cancer Screening -  Colonoscopy on 5/25/2020 - diverticulosis otherwise normal, repeat in 10 years    Eye Exam - Recommend annually. Last Eye Exam - 1/2018.    Dental Exam - Recommend biannual exams.     Vaccinations -   Immunization History   Administered Date(s) Administered    COVID-19 MRNA, LN-S PF (MODERNA HALF 0.25 ML DOSE) 07/21/2022    COVID-19, MRNA, LN-S, PF (MODERNA FULL 0.5 ML DOSE) 06/12/2021, 07/10/2021    COVID-19, mRNA, LNP-S, bivalent booster, PF (Moderna Omicron)12 + YEARS 12/31/2022    Zoster Recombinant 07/21/2022, 12/31/2022        1. Primary hypertension  Overview:  Current medication: Benazepril - Hydrochlorthiazide 20-12.5 mg one po q day.  Low Sodium Diet (DASH Diet - Less than 2 grams of sodium per day).  Monitor blood pressure daily and log. Report consistent numbers greater than 140/90.  Maintain healthy weight with goal BMI <30. Exercise 30 minutes per day, 5 days per week.    Assessment & Plan:  Blood pressure slightly elevated in clinic but patient reports normals at home.  Continue current regimen.    AHA/ACC goal <130/80. JNC8 goal <140/90       Orders:  -     Comprehensive Metabolic Panel; Future; Expected date: 12/26/2024    2. Encounter for hepatitis C screening test for low risk patient  -     Hepatitis C Antibody; Future; Expected date: 12/26/2024    3. " Encounter for screening for HIV  -     HIV 1/2 Ag/Ab (4th Gen); Future; Expected date: 12/26/2024    4. Mixed hyperlipidemia  Overview:  Current medication: Crestor 5 mg one po qhs.  Stressed importance of dietary modifications. Follow a low cholesterol, low saturated fat diet with less that 200mg of cholesterol a day.  Avoid fried foods and high saturated fats (high saturated fats less than 7% of calories).  Add Flax Seed/Fish Oil supplements to diet. Increase dietary fiber.  Regular exercise can reduce LDL and raise HDL. Stressed importance of physical activity 5 times per week for 30 minutes per day.        5. Prediabetes  Overview:  Patient is diet controlled.  Follow ADA Diet. Avoid soda, simple sweets, and limit rice/pasta/breads/starches (no more than 45-50 grams per meal).  Maintain healthy weight with goal BMI <30.  Exercise 5 times per week for 30 minutes per day.  Stressed importance of daily foot exams especially if neuropathy is present.  Patient was instructed to notify physician if they notice any sores or skin lesions on the feet.  Stressed the importance of wearing proper fitting comfortable shoes i.e. diabetic footwear.  They were instructed to always wear shoes and never go barefooted.  Stressed importance of annual dilated eye exam.       6. Wellness examination    7. Screening for malignant neoplasm of prostate  -     PSA, Screening; Future; Expected date: 12/26/2024    8. Acute pain of both knees  Assessment & Plan:  Patient has intermittent pain and swelling to both knees.  It is flared up over the last few days.  He is complaining of mild pain.  Trial of diclofenac gel.      Other orders  -     diclofenac sodium (VOLTAREN ARTHRITIS PAIN) 1 % Gel; Apply 2 g topically 4 (four) times daily.  Dispense: 100 g; Refill: 2         Follow up in about 2 weeks (around 10/10/2024) for skin tag removal. In addition to their scheduled follow up, the patient has also been instructed to follow up on as  needed basis.     Future Appointments   Date Time Provider Department Center   10/17/2024 11:00 AM [facility], [facility] [place]   12/19/2024  9:40 AM LAB, [facility] [place]   1/17/2025  8:00 AM [facility] CT1 500 LB LIMIT [facility]        Yojaan Rowley MD

## 2024-09-26 NOTE — ASSESSMENT & PLAN NOTE
Blood pressure slightly elevated in clinic but patient reports normals at home.  Continue current regimen.    AHA/ACC goal <130/80. JNC8 goal <140/90

## 2024-09-26 NOTE — ASSESSMENT & PLAN NOTE
Patient has intermittent pain and swelling to both knees.  It is flared up over the last few days.  He is complaining of mild pain.  Trial of diclofenac gel.

## 2024-10-17 ENCOUNTER — OFFICE VISIT (OUTPATIENT)
Dept: FAMILY MEDICINE | Facility: CLINIC | Age: 59
End: 2024-10-17
Payer: COMMERCIAL

## 2024-10-17 VITALS
OXYGEN SATURATION: 97 % | TEMPERATURE: 98 F | WEIGHT: 260 LBS | HEART RATE: 80 BPM | BODY MASS INDEX: 35.21 KG/M2 | RESPIRATION RATE: 20 BRPM | HEIGHT: 72 IN | DIASTOLIC BLOOD PRESSURE: 86 MMHG | SYSTOLIC BLOOD PRESSURE: 136 MMHG

## 2024-10-17 DIAGNOSIS — L91.8 ACROCHORDON: Primary | ICD-10-CM

## 2024-10-17 NOTE — PROCEDURES
Stin Tag removal    Date/Time: 10/17/2024 11:00 AM    Performed by: Yojana Rowley MD  Authorized by: Yojana Rowley MD    Consent Done?:  Yes (Verbal)  Timeout: prior to procedure the correct patient, procedure, and site was verified    Local anesthesia used?: No    Assistants?: Yes    List of assistants:  Erik Lyles NP  : Aylin (12)  Location: 4 to left flank, 8 to right flank.  Excision type:  Skin  Malignancy:  Benign  Specimens?: No     Hemostasis was obtained.   Patient tolerated the procedure well with no immediate complications.    The skin around each skin tag was prepped with iodine.  This was allowed to dry.  Scissors were used dyspneic each skin tag at its base.  Four were removed from the right flank and 8 were removed from the left flank.  There was minimal oozing of blood from each lesion.  Hemostasis was achieved with silver nitrate.  No dressings were needed.  The patient tolerated the procedure well.

## 2025-01-06 ENCOUNTER — OFFICE VISIT (OUTPATIENT)
Dept: FAMILY MEDICINE | Facility: CLINIC | Age: 60
End: 2025-01-06
Payer: COMMERCIAL

## 2025-01-06 VITALS
OXYGEN SATURATION: 97 % | DIASTOLIC BLOOD PRESSURE: 90 MMHG | WEIGHT: 269 LBS | TEMPERATURE: 98 F | HEIGHT: 72 IN | SYSTOLIC BLOOD PRESSURE: 147 MMHG | HEART RATE: 85 BPM | RESPIRATION RATE: 20 BRPM | BODY MASS INDEX: 36.44 KG/M2

## 2025-01-06 DIAGNOSIS — E55.9 VITAMIN D DEFICIENCY: ICD-10-CM

## 2025-01-06 DIAGNOSIS — E78.2 MIXED HYPERLIPIDEMIA: ICD-10-CM

## 2025-01-06 DIAGNOSIS — F17.200 SMOKER: ICD-10-CM

## 2025-01-06 DIAGNOSIS — I10 PRIMARY HYPERTENSION: ICD-10-CM

## 2025-01-06 DIAGNOSIS — R74.01 TRANSAMINITIS: Primary | ICD-10-CM

## 2025-01-06 DIAGNOSIS — E66.01 SEVERE OBESITY (BMI 35.0-39.9) WITH COMORBIDITY: ICD-10-CM

## 2025-01-06 DIAGNOSIS — R73.03 PREDIABETES: ICD-10-CM

## 2025-01-06 RX ORDER — ERGOCALCIFEROL 1.25 MG/1
50000 CAPSULE ORAL
Qty: 18 CAPSULE | Refills: 1 | Status: SHIPPED | OUTPATIENT
Start: 2025-01-06

## 2025-01-06 NOTE — ASSESSMENT & PLAN NOTE
Pt reports LDCT is scheduled for January 17, 2025; will get results and review accordingly with pt.  Continue monitoring cessation status.

## 2025-01-06 NOTE — PROGRESS NOTES
[facility]  [facility]    Patient ID: [id number]     Chief Complaint: Follow-up (Lab results )      HPI:     Jonny Michaud is a 59 y.o. male here today for lab results.    Past Medical History:   Diagnosis Date    Fatty liver     AUGIE (obstructive sleep apnea)     Personal history of tobacco use, presenting hazards to health 2023    Prediabetes     Smoker     Vitamin D deficiency         History reviewed. No pertinent surgical history.     Social History     Tobacco Use    Smoking status: Former     Current packs/day: 0.00     Average packs/day: 1 pack/day for 38.0 years (38.0 ttl pk-yrs)     Types: Cigarettes     Start date:      Quit date: 2023     Years since quittin.0    Smokeless tobacco: Never   Substance and Sexual Activity    Alcohol use: Yes     Alcohol/week: 21.0 standard drinks of alcohol     Types: 21 Cans of beer per week    Drug use: Never    Sexual activity: Yes     Partners: Female        Current Outpatient Medications   Medication Instructions    benazepril-hydrochlorthiazide (LOTENSIN HCT) 20-12.5 mg per tablet 1 tablet, Oral, Every morning    diclofenac sodium (VOLTAREN ARTHRITIS PAIN) 2 g, Topical (Top), 4 times daily    ergocalciferol (ERGOCALCIFEROL) 50,000 Units, Oral, Every 5 days    NON FORMULARY MEDICATION Daily    rosuvastatin (CRESTOR) 5 mg, Oral, Nightly    vitamin E 400 Units, Daily       Review of patient's allergies indicates:  No Known Allergies     Patient Care Team:  Cayetano Ferguson Sr., MD as PCP - General (Family Medicine)  El Law MD as Consulting Physician (Gastroenterology)     Subjective:     Review of Systems    12 point review of systems conducted, negative except as stated in the history of present illness. See HPI for details.    Objective:     Visit Vitals  BP (!) (P) 144/86 (BP Location: Right arm, Patient Position: Sitting)   Pulse 85   Temp 98.3 °F (36.8 °C)   Resp 20   Ht 6' (1.829 m)   Wt 122 kg (269 lb)   SpO2  97%   BMI 36.48 kg/m²       Physical Exam  Vitals and nursing note reviewed.   Constitutional:       General: He is not in acute distress.     Appearance: He is not ill-appearing.   HENT:      Head: Normocephalic and atraumatic.      Mouth/Throat:      Mouth: Mucous membranes are moist.      Pharynx: Oropharynx is clear.   Eyes:      General: No scleral icterus.     Extraocular Movements: Extraocular movements intact.      Conjunctiva/sclera: Conjunctivae normal.      Pupils: Pupils are equal, round, and reactive to light.   Neck:      Vascular: No carotid bruit.   Cardiovascular:      Rate and Rhythm: Normal rate and regular rhythm.      Heart sounds: No murmur heard.     No friction rub. No gallop.   Pulmonary:      Effort: Pulmonary effort is normal. No respiratory distress.      Breath sounds: Normal breath sounds. No wheezing, rhonchi or rales.   Abdominal:      General: Abdomen is flat. Bowel sounds are normal. There is no distension.      Palpations: Abdomen is soft. There is no mass.      Tenderness: There is no abdominal tenderness.   Musculoskeletal:         General: Normal range of motion.      Cervical back: Normal range of motion and neck supple.   Skin:     General: Skin is warm and dry.   Neurological:      General: No focal deficit present.      Mental Status: He is alert.   Psychiatric:         Mood and Affect: Mood normal.         Labs Reviewed:     Chemistry:  Lab Results   Component Value Date     12/19/2024    K 4.9 12/19/2024    BUN 11 12/19/2024    CREATININE 0.74 (L) 12/19/2024    EGFRNORACEVR 105 12/19/2024    CALCIUM 9.2 12/19/2024    ALBUMIN 4.1 12/19/2024    BILIDIR <0.20 02/20/2024    AST 51 (H) 12/19/2024    ALT 56 (H) 12/19/2024    [id number] 29.9 (L) 08/29/2024    TSH 2.060 08/29/2024        Lab Results   Component Value Date    HGBA1C 5.9 (H) 08/29/2024        Hematology:  Lab Results   Component Value Date    WBC 6.5 08/29/2024    HGB 15.6 08/29/2024    HCT 48.9 08/29/2024  "    08/29/2024       Lipid Panel:  Lab Results   Component Value Date    CHOL 154 08/29/2024    HDL 51 08/29/2024    TRIG 122 08/29/2024        Urine:  No results found for: "COLORUA", "APPEARANCEUA", "SGUA", "PHUA", "PROTEINUA", "GLUCOSEUA", "KETONESUA", "BLOODUA", "NITRITESUA", "LEUKOCYTESUR", "RBCUA", "WBCUA", "BACTERIA", "SQEPUA", "HYALINECASTS", "CREATRANDUR", "PROTEINURINE", "UPROTCREA"     Assessment:       ICD-10-CM ICD-9-CM   1. Transaminitis  R74.01 790.4   2. Vitamin D deficiency  E55.9 268.9   3. Primary hypertension  I10 401.9   4. Smoker  F17.200 305.1   5. Severe obesity (BMI 35.0-39.9) with comorbidity  E66.01 278.01   6. Mixed hyperlipidemia  E78.2 272.2   7. Prediabetes  R73.03 790.29        Plan:     1. Transaminitis  Overview:  ALT 56 and AST 51 today.  Hep C screen negative today.  No known hx of prior transaminitis.   However, pt reports Fibroscan (January 2024) with Dr. Law showed Fatty Liver, and was told by Dr. Law to follow up annually with Fibroscan.    Assessment & Plan:  Pt reports drinking 6 beers per day after work.  Discussed consequences of excessive ETOH intake, especially related to liver damage.  Declines discussion of ETOH cessation or reduction, since pt reports, "I can quit if I want to, but I enjoy drinking after work."  Will attempt to re-explore ETOH cessation or reduction next visit.    Pt will contact Dr. Law () to schedule follow up with annual Fibroscan.    Repeat CMP in 6 mos.    Orders:  -     Cancel: Comprehensive Metabolic Panel; Future; Expected date: 04/06/2025  -     Comprehensive Metabolic Panel; Future; Expected date: 07/06/2025    2. Vitamin D deficiency  Overview:  Vitamin-D supplement 70917 units weekly.    Assessment & Plan:  Patient's last vitamin-D level was 29.9 on 8/2024.    Patient has been on 92521 units weekly.  Continue current Vitamin D supplementation, and repeat Vitamin D level 6 mos.    Orders:  -     Cancel: Vitamin D; Future; " Expected date: 04/06/2025  -     ergocalciferol (ERGOCALCIFEROL) 50,000 unit Cap; Take 1 capsule (50,000 Units total) by mouth Every 5 (five) days.  Dispense: 18 capsule; Refill: 1  -     Vitamin D; Future; Expected date: 07/06/2025    3. Primary hypertension  Overview:  Current medication: Benazepril - Hydrochlorthiazide 20-12.5 mg one po q day.  Low Sodium Diet (DASH Diet - Less than 2 grams of sodium per day).  Monitor blood pressure daily and log. Report consistent numbers greater than 140/90.  Maintain healthy weight with goal BMI <30. Exercise 30 minutes per day, 5 days per week.    Assessment & Plan:  Blood pressure slightly elevated in clinic but patient reports normals at home.  Continue current regimen.    Pt will do BP logs at home for 1-2 weeks, and will drop off at clinic.    AHA/ACC goal <130/80. JNC8 goal <140/90       Orders:  -     CBC Auto Differential; Future; Expected date: 07/06/2025  -     TSH; Future; Expected date: 07/06/2025    4. Smoker  Overview:  Pt reports over 30 year history of smoking.  Pt reports smoking cessation since December 2023.        Assessment & Plan:  Pt reports LDCT is scheduled for January 17, 2025; will get results and review accordingly with pt.  Continue monitoring cessation status.      5. Severe obesity (BMI 35.0-39.9) with comorbidity  Overview:  BMI is 36.48 today.      Assessment & Plan:  Goal BMI < 30.    Pt declines ambulatory referral to weight loss clinic.    Recommend:  Exercise 5 times a week for 30 minutes per day.  Avoid soda, simple sugars, excessive rice, potatoes or bread. Limit fast foods and fried foods.  Choose complex carbs in moderation (example: green vegetables, beans, oatmeal). Eat plenty of fresh fruits and vegetables with lean meats daily.  Do not skip meals. Eat a balanced portion size.    Will re-visit possible ambulatory referral to weight loss clinic at next office visit.  Avoid fad diets. Consider permanent healthy life style changes.         6. Mixed hyperlipidemia  Overview:  Current medication: Crestor 5 mg one po qhs.  Stressed importance of dietary modifications. Follow a low cholesterol, low saturated fat diet with less that 200mg of cholesterol a day.  Avoid fried foods and high saturated fats (high saturated fats less than 7% of calories).  Add Flax Seed/Fish Oil supplements to diet. Increase dietary fiber.  Regular exercise can reduce LDL and raise HDL. Stressed importance of physical activity 5 times per week for 30 minutes per day.      Orders:  -     Lipid Panel; Future; Expected date: 07/06/2025    7. Prediabetes  Overview:  Patient is diet controlled.  Follow ADA Diet. Avoid soda, simple sweets, and limit rice/pasta/breads/starches (no more than 45-50 grams per meal).  Maintain healthy weight with goal BMI <30.  Exercise 5 times per week for 30 minutes per day.  Stressed importance of daily foot exams especially if neuropathy is present.  Patient was instructed to notify physician if they notice any sores or skin lesions on the feet.  Stressed the importance of wearing proper fitting comfortable shoes i.e. diabetic footwear.  They were instructed to always wear shoes and never go barefooted.  Stressed importance of annual dilated eye exam.     Orders:  -     Hemoglobin A1C; Future; Expected date: 07/06/2025         Follow up in about 6 months (around 7/6/2025) for routine follow up with labs.. In addition to their scheduled follow up, the patient has also been instructed to follow up on as needed basis.     Future Appointments   Date Time Provider Department Center   1/17/2025  8:00 AM [facility] CT1 500 LB LIMIT [facility] [place]   7/2/2025  7:20 AM LAB, [facility]   7/7/2025  4:00 PM PROVIDER, [facility]   9/25/2025  8:00 AM LAB, [facility] [place]   10/2/2025  3:00 PM Cayetano Ferguson Sr., MD [facility]

## 2025-01-06 NOTE — ASSESSMENT & PLAN NOTE
Goal BMI < 30.    Pt declines ambulatory referral to weight loss clinic.    Recommend:  Exercise 5 times a week for 30 minutes per day.  Avoid soda, simple sugars, excessive rice, potatoes or bread. Limit fast foods and fried foods.  Choose complex carbs in moderation (example: green vegetables, beans, oatmeal). Eat plenty of fresh fruits and vegetables with lean meats daily.  Do not skip meals. Eat a balanced portion size.    Will re-visit possible ambulatory referral to weight loss clinic at next office visit.  Avoid fad diets. Consider permanent healthy life style changes.

## 2025-01-06 NOTE — ASSESSMENT & PLAN NOTE
Blood pressure slightly elevated in clinic but patient reports normals at home.  Continue current regimen.    Pt will do BP logs at home for 1-2 weeks, and will drop off at clinic.    AHA/ACC goal <130/80. JNC8 goal <140/90

## 2025-01-06 NOTE — ASSESSMENT & PLAN NOTE
"Pt reports drinking 6 beers per day after work.  Discussed consequences of excessive ETOH intake, especially related to liver damage.  Declines discussion of ETOH cessation or reduction, since pt reports, "I can quit if I want to, but I enjoy drinking after work."  Will attempt to re-explore ETOH cessation or reduction next visit.    Pt will contact Dr. Law () to schedule follow up with annual Fibroscan.    Repeat CMP in 6 mos.  "

## 2025-01-06 NOTE — ASSESSMENT & PLAN NOTE
Patient's last vitamin-D level was 29.9 on 8/2024.    Patient has been on 08419 units weekly.  Continue current Vitamin D supplementation, and repeat Vitamin D level 6 mos.

## 2025-01-30 ENCOUNTER — DOCUMENTATION ONLY (OUTPATIENT)
Dept: FAMILY MEDICINE | Facility: CLINIC | Age: 60
End: 2025-01-30
Payer: COMMERCIAL

## 2025-04-28 ENCOUNTER — TELEPHONE (OUTPATIENT)
Dept: PHARMACY | Facility: CLINIC | Age: 60
End: 2025-04-28
Payer: COMMERCIAL

## 2025-04-28 NOTE — TELEPHONE ENCOUNTER
[facility]/Population Health Chart Review & Patient Outreach Details For Medication Adherence Project    Reason for Outreach Encounter: 3rd Party payor non-compliance report ([employer], [employer], [facility], etc)  2.  Patient Outreach Method: Startapphart message  3.   Medication in question: benazepril/hctz   LAST FILLED: 12/2/24 for 90 day supply  Hypertension Medications              benazepril-hydrochlorthiazide (LOTENSIN HCT) 20-12.5 mg per tablet Take 1 tablet by mouth every morning.              4.  Reviewed and or Updates Made To: Patient Chart  5. Outreach Outcomes and/or actions taken: Sent inquiry to patient: Waiting for response.

## 2025-07-08 ENCOUNTER — TELEPHONE (OUTPATIENT)
Dept: FAMILY MEDICINE | Facility: CLINIC | Age: 60
End: 2025-07-08
Payer: COMMERCIAL

## 2025-07-08 NOTE — TELEPHONE ENCOUNTER
----- Message from Mamta Barbosa NP sent at 7/8/2025  1:23 PM CDT -----  This is a Dr. Ferguson patient.   Please call this patient for me please:      Please inform patient of lab results.     Emphysema: A long-term lung condition, likely from smoking. No change or urgent issues.  Tiny lung nodule: A small spot in the right upper lung has gotten smaller since your last scan. It likely represents a benign (non-cancerous) granuloma, possibly from a past infection.  No signs of cancer or infection.  Lung-RADS 2: Very low risk (less than 1%) of cancer.  Next step: Repeat CT scan in 1 year to monitor.    Thanks for all you do,   Mamta   ----- Message -----  From: Interface, Rad Results In  Sent: 7/7/2025   1:40 PM CDT  To: Results Phyllis Abreu Sr

## 2025-07-14 ENCOUNTER — TELEPHONE (OUTPATIENT)
Dept: RADIOLOGY | Facility: HOSPITAL | Age: 60
End: 2025-07-14
Payer: COMMERCIAL

## 2025-07-14 NOTE — TELEPHONE ENCOUNTER
----- Message from TIFFANIE Michelle sent at 7/14/2025  7:13 AM CDT -----  Regarding: RE: 3  Due for annual LDCT scan 7/8/26    ·Next step: Repeat CT scan in 1 year to monitor.   Thanks for all you do,   Mamta     CT chest 7/7/25-Impression:  Emphysema and additional chronic findings as above with no acute pathology at the chest.  A small subcentimeter nodule at the posterior right upper lung is slightly smaller and less dense since a chest CT 01/09/2024 and most suggestive of granuloma.  Lung RADS 2; a follow-up lung cancer screening chest CT is recommended in 1 year.  Electronically signed by:Kenneth Sullivan  Date:                                            07/07/2025  ----- Message -----  From: Viviana Dale RN  Sent: 7/7/2025  12:00 AM CDT  To: Viviana Dale RN  Subject: RE: 3                                            Due 7/19/25 7/18/24-Impression:  Stable 6 mm pulmonary nodule in the right upper lobe.  One year chest CT follow-up recommended according to Fleischner criteria.  ----- Message -----  From: Viviana Dale RN  Sent: 7/22/2024  12:00 AM CDT  To: Viviana Dale RN  Subject: RE: 3                                            7/18/24-follow up ct chest  ----- Message -----  From: Viviana Dale RN  Sent: 7/1/2024  12:00 AM CDT  To: Viviana Dale RN  Subject: RE: 3                                            Dr. Rowley placed order for follow up Ct in 6 months.   ----- Message -----  From: Viviana Dale RN  Sent: 1/16/2024  12:00 AM CST  To: Viviana Dale RN  Subject: 3                                                Msg sent to Dr. Cayetano Rowley for follow up order.   ----- Message -----  From: Viviana Dale RN  Sent: 1/11/2024   2:17 PM CST  To: Viviana Dale RN  Subject: 3                                                3